# Patient Record
Sex: FEMALE | Employment: UNEMPLOYED | URBAN - METROPOLITAN AREA
[De-identification: names, ages, dates, MRNs, and addresses within clinical notes are randomized per-mention and may not be internally consistent; named-entity substitution may affect disease eponyms.]

---

## 2017-03-01 ENCOUNTER — TRANSFERRED RECORDS (OUTPATIENT)
Dept: HEALTH INFORMATION MANAGEMENT | Facility: CLINIC | Age: 52
End: 2017-03-01

## 2017-09-21 ENCOUNTER — OFFICE VISIT (OUTPATIENT)
Dept: NEUROLOGY | Facility: CLINIC | Age: 52
End: 2017-09-21

## 2017-09-21 VITALS
DIASTOLIC BLOOD PRESSURE: 58 MMHG | HEIGHT: 62 IN | BODY MASS INDEX: 21.64 KG/M2 | WEIGHT: 117.6 LBS | SYSTOLIC BLOOD PRESSURE: 118 MMHG

## 2017-09-21 DIAGNOSIS — G40.209 PARTIAL EPILEPSY WITH IMPAIRMENT OF CONSCIOUSNESS (H): ICD-10-CM

## 2017-09-21 DIAGNOSIS — G40.209 LOCALIZATION-RELATED SYMPTOMATIC EPILEPSY AND EPILEPTIC SYNDROMES WITH COMPLEX PARTIAL SEIZURES, NOT INTRACTABLE, WITHOUT STATUS EPILEPTICUS (H): ICD-10-CM

## 2017-09-21 RX ORDER — KETOCONAZOLE 20 MG/G
CREAM TOPICAL
Refills: 3 | COMMUNITY
Start: 2017-09-01

## 2017-09-21 RX ORDER — PHENYTOIN SODIUM 100 MG/1
CAPSULE, EXTENDED RELEASE ORAL
Qty: 60 CAPSULE | Refills: 11 | Status: SHIPPED | OUTPATIENT
Start: 2017-09-21 | End: 2020-12-10

## 2017-09-21 NOTE — MR AVS SNAPSHOT
After Visit Summary   9/21/2017    Kimberly Noe    MRN: [id number]           Patient Information     Date Of Birth          1965        Visit Information        Provider Department      9/21/2017 2:00 PM Hieu Yoo MD [facility]        Today's Diagnoses     Partial epilepsy with impairment of consciousness (H)        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, not intractable, without status epilepticus (H)           Follow-ups after your visit        Follow-up notes from your care team     Return in about 1 year (around 9/21/2018).      Your next 10 appointments already scheduled     Nov 21, 2017 11:30 AM CST   [facility])    [address], [address]  [facility] [address] [phone]           Note: This is not an onsite visit; there is no need to come to the facility.              Who to contact     Please call your clinic at [phone] to:    Ask questions about your health    Make or cancel appointments    Discuss your medicines    Learn about your test results    Speak to your doctor   If you have compliments or concerns about an experience at your clinic, or if you wish to file a complaint, please contact [facility] Patient Relations at [phone] or email us at [email or web address]         Additional Information About Your Visit        MyChart Information     Tytot gives you secure access to your electronic health record. If you see a primary care provider, you can also send messages to your care team and make appointments. If you have questions, please call your primary care clinic.  If you do not have a primary care provider, please call [phone] and they will assist you.      InGaugeIt is an electronic gateway that provides easy, online access to your  "medical records. With jslyhlt, you can request a clinic appointment, read your test results, renew a prescription or communicate with your care team.     To access your existing account, please contact your [facility] or call [phone] for assistance.        Care EveryWhere ID     This is your Care EveryWhere ID. This could be used by other organizations to access your [facility] medical records  [id number]        Your Vitals Were     Height BMI (Body Mass Index)                5' 2\" (157.5 cm) 21.51 kg/m2           Blood Pressure from Last 3 Encounters:   09/21/17 118/58   10/18/16 104/64   07/06/16 (!) 86/56    Weight from Last 3 Encounters:   09/21/17 117 lb 9.6 oz (53.3 kg)   10/18/16 119 lb 3.2 oz (54.1 kg)   07/06/16 121 lb (54.9 kg)              Today, you had the following     No orders found for display         Where to get your medicines      These medications were sent to [facility] [address] [place], [place] [address] [address] AT [facility] [employer]  [facility] [address] [place], [facility] [address]     Phone:  [phone]     DILANTIN 100 MG CR capsule    phenytoin 30 MG CR capsule          Primary Care Provider Office Phone # Fax #    Ketty Lazardevontenbjony, APRN -[phone] [address] [place] [address]        Equal Access to Services     JAX MENSAH AH: Hadii aad ku hadasho Soomaali, waaxda luqadaha, qaybta kaalmada adeegyada, pacheco garcia haykarol cantu . So [facility] [phone].    ATENCIÓN: Si habla español, tiene a santana disposición servicios gratuitos de asistencia lingüística. Llame al [phone].    We comply with applicable federal civil rights laws and [place] laws. We do not discriminate on the basis of race, color, national origin, age, disability sex, sexual orientation or gender identity.            Thank you!     Thank you for choosing [facility]  for your care. Our goal is always to provide you " with excellent care. Hearing back from our patients is one way we can continue to improve our services. Please take a few minutes to complete the written survey that you may receive in the mail after your visit with us. Thank you!             Your Updated Medication List - Protect others around you: Learn how to safely use, store and throw away your medicines at www.disposemymeds.org.          This list is accurate as of: 9/21/17  2:53 PM.  Always use your most recent med list.                   Brand Name Dispense Instructions for use Diagnosis    BOOSTRIX 5-2.5-18.5 LF-MCG/0.5 injection   Generic drug:  Tdap (tetanus-diptheria-acell pertussis)      ADM 0.5ML IM UTD        calcium carbonate 1250 MG tablet    OS-MARK 500 mg [place]. Ca     1000 mg daily        clotrimazole 1 % cream    LOTRIMIN          CRANBERRY      daily.        * DILANTIN 100 MG CR capsule   Generic drug:  phenytoin     60 capsule    TAKE 2 CAPSULES BY MOUTH DAILY EMELYN Brand only, do not substitute    Partial epilepsy with impairment of consciousness (H)       * phenytoin 30 MG CR capsule    DILANTIN    30 capsule    Take one po QHS Brand name necessary, no not substitute.    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, not intractable, without status epilepticus (H)       folic acid 1 MG tablet    FOLVITE    180 tablet    TAKE TWO TABLETS BY MOUTH EVERY MORNING    Localization-related (focal) (partial) epilepsy and epileptic syndromes with complex partial seizures, with intractable epilepsy       ketoconazole 2 % cream    NIZORAL     JAH EXT AA QD        Multi-vitamin Tabs tablet   Generic drug:  multivitamin, therapeutic with minerals      1 TABLET DAILY        OMEGA 3 PO      1 daily.        order for DME     1 each    Equipment being ordered: velcro wrist splint    Left wrist pain       vitamin D 400 UNITS tablet     3mo    2000mg po per day    Routine general medical examination at a [facility]  care facility       * Notice:  This list has 2 medication(s) that are the same as other medications prescribed for you. Read the directions carefully, and ask your doctor or other care provider to review them with you.

## 2017-09-21 NOTE — LETTER
2017       RE: Kimberly Noe  : 1965   MRN: [id number]      Dear Colleague,    Thank you for referring your patient, Kimberly Noe, to the [facility] at [facility]. Please see a copy of my visit note below.    Entered in error    BACKGROUND HISTORY:  This is a 52-year-old with 7 complex partial seizures during her life, typically in the setting of significant sleep deprivation, low phenytoin level or significant stress.  Two attempts at phenytoin discontinuation, 1 after 5 years of seizure freedom, resulted in seizure breakthrough.  Other seizure breakthroughs have occurred through low phenytoin levels, so we have concluded that she needs anticonvulsant medications indefinitely.  Previous low vitamin D was treated.  A DEXA scan in 2012 with LS spine BMD 1.210 (Z score = +0.7); left hip BMD 0.883 (Z-scores -0.3).        HISTORY OF PRESENT ILLNESS:  She returns for a followup 10 months since last visit.  She has had no seizures since last visit.  Her last seizure occurred 19 years ago.      CURRENT ANTICONVULSANT MEDICATIONS:  Phenytoin (brand name Dilantin) (100 mg, 30 mg) 230 mg each day at bedtime.      She brings anticonvulsant levels with her today.  Phenytoin level done on 2017 was 12.6.  Her vitamin D level was 51.6.      She reports compliance with anticonvulsant medications and uses a pillbox.      REVIEW OF SYSTEMS:  Continues unremarkable.  Specifically, she denies nausea, vomiting, double vision, depression, slowed cognitive function, ataxia or any falls.      OTHER ISSUES:  There have been no fractures.  She had a second DEXA scan done and results have been scanned into EPIC.  The left femur BMD was 0.788 g/cm2 with a Z-score of -0.7.  The lumbar spine BMD was 1.132, g/cm2 with a Z-score of -0.01.  This represents a decrease in both the LS spine and left hip BMD Z-score.  She has met the threshold for osteopenia,  but has not formally met the threshold for osteoporosis.  It is not clear whether the decline is more than would be expected over 5 years.      There have been no fractures.  She does report some low back pain, but this does not radiate.      There have been no other medical issues.  She is safe to drive.  She is postmenopausal now for approximately a year.      PHYSICAL EXAMINATION:     Weight is 117 pounds and not significantly changed.  Blood pressure 118/58.  BMI is 21.5.  On examination, she is her usual pleasant and cooperative self.  Gait is normal including tandem and Romberg.  One-foot stand is done well.  Visual fields are full.  Extraocular movements are intact without nystagmus.  Smile is symmetrical.  Tongue is midline and strong.  There is no drift, pronation or tremor.  Reflexes are symmetrical.  Knee jerks and ankle jerks are easily obtained.  Vibratory sensation is 10 seconds at the large toe bilaterally.  Finger-finger-nose is done well.      IMPRESSION:   1.  Partial seizures, not intractable.  Simple to complex partial to secondarily generalized tonic-clonic seizures.  She continues with complete control of seizures.  She has now been seizure free for more than 19 years.  However, her seizures consistently broken through when anticonvulsant levels are low or medications were discontinued in the past.  History does suggest that she needs anticonvulsants indefinitely.  She has gotten by with lower doses and low levels of anticonvulsant levels since last visit.   2.  There has been some decline in bone mineral density in spite of a normal vitamin D level.  It is not clear whether this is an acceptable decline given her postmenopausal state or whether this is an indication that phenytoin should be replaced with another anticonvulsant medication.  She meets criteria for osteopenia at least as far as hip is concerned, but does not formally meet criteria for osteoporosis.  Additionally, alkaline  phosphatase level is somewhat increased.  Under the circumstances, it may be appropriate to transition to levetiracetam.   3.  She now clearly postmenopausal.  There has been no worsening of seizures.      PLAN:   1.  Continue current phenytoin dose.     2.  We will try to get the full DEXA reports from the paper chart for more complete comparison.   3.  Phone visit in several months.  At that point, we would consider changing to levetiracetam.  We discussed the pros and cons of changing from phenytoin to levetiracetam in a preliminary fashion today.  These include but are not limited to irritability, depression and other indications of psychiatric decompensation.  There is also the possibility that levetiracetam will not be as effective as phenytoin.  However, given the fact that her seizures have been controlled for such a long time, it appears that her seizure tendency is low and so one would expect that levetiracetam would be as successful at seizure control as phenytoin.   4.  Return to clinic in 1 year.  Call if any seizures.         JUANCHO YOO MD             D: 2017 17:52   T: 2017 21:45   MT: tobi      Name:     MARYJO RUIZ   MRN:      [id number]        Account:      [id number]   :      1965           Service Date: 2017      Document: [id number]        Again, thank you for allowing me to participate in the care of your patient.      Sincerely,    Juancho Yoo MD

## 2017-09-22 NOTE — PROGRESS NOTES
BACKGROUND HISTORY:  This is a 52-year-old with 7 complex partial seizures during her life, typically in the setting of significant sleep deprivation, low phenytoin level or significant stress.  Two attempts at phenytoin discontinuation, 1 after 5 years of seizure freedom, resulted in seizure breakthrough.  Other seizure breakthroughs have occurred through low phenytoin levels, so we have concluded that she needs anticonvulsant medications indefinitely.  Previous low vitamin D was treated.  A DEXA scan in 06/2012 with LS spine BMD 1.210 (Z score = +0.7); left hip BMD 0.883 (Z-scores -0.3).        HISTORY OF PRESENT ILLNESS:  She returns for a followup 10 months since last visit.  She has had no seizures since last visit.  Her last seizure occurred 19 years ago.      CURRENT ANTICONVULSANT MEDICATIONS:  Phenytoin (brand name Dilantin) (100 mg, 30 mg) 230 mg each day at bedtime.      She brings anticonvulsant levels with her today.  Phenytoin level done on 09/01/2017 was 12.6.  Her vitamin D level was 51.6.      She reports compliance with anticonvulsant medications and uses a pillbox.      REVIEW OF SYSTEMS:  Continues unremarkable.  Specifically, she denies nausea, vomiting, double vision, depression, slowed cognitive function, ataxia or any falls.      OTHER ISSUES:  There have been no fractures.  She had a second DEXA scan done and results have been scanned into EPIC.  The left femur BMD was 0.788 g/cm2 with a Z-score of -0.7.  The lumbar spine BMD was 1.132, g/cm2 with a Z-score of -0.01.  This represents a decrease in both the LS spine and left hip BMD Z-score.  She has met the threshold for osteopenia, but has not formally met the threshold for osteoporosis.  It is not clear whether the decline is more than would be expected over 5 years.      There have been no fractures.  She does report some low back pain, but this does not radiate.      There have been no other medical issues.  She is safe to drive.  She is  postmenopausal now for approximately a year.      PHYSICAL EXAMINATION:     Weight is 117 pounds and not significantly changed.  Blood pressure 118/58.  BMI is 21.5.  On examination, she is her usual pleasant and cooperative self.  Gait is normal including tandem and Romberg.  One-foot stand is done well.  Visual fields are full.  Extraocular movements are intact without nystagmus.  Smile is symmetrical.  Tongue is midline and strong.  There is no drift, pronation or tremor.  Reflexes are symmetrical.  Knee jerks and ankle jerks are easily obtained.  Vibratory sensation is 10 seconds at the large toe bilaterally.  Finger-finger-nose is done well.      IMPRESSION:   1.  Partial seizures, not intractable.  Simple to complex partial to secondarily generalized tonic-clonic seizures.  She continues with complete control of seizures.  She has now been seizure free for more than 19 years.  However, her seizures consistently broken through when anticonvulsant levels are low or medications were discontinued in the past.  History does suggest that she needs anticonvulsants indefinitely.  She has gotten by with lower doses and low levels of anticonvulsant levels since last visit.   2.  There has been some decline in bone mineral density in spite of a normal vitamin D level.  It is not clear whether this is an acceptable decline given her postmenopausal state or whether this is an indication that phenytoin should be replaced with another anticonvulsant medication.  She meets criteria for osteopenia at least as far as hip is concerned, but does not formally meet criteria for osteoporosis.  Additionally, alkaline phosphatase level is somewhat increased.  Under the circumstances, it may be appropriate to transition to levetiracetam.   3.  She now clearly postmenopausal.  There has been no worsening of seizures.      PLAN:   1.  Continue current phenytoin dose.     2.  We will try to get the full DEXA reports from the paper chart  for more complete comparison.   3.  Phone visit in several months.  At that point, we would consider changing to levetiracetam.  We discussed the pros and cons of changing from phenytoin to levetiracetam in a preliminary fashion today.  These include but are not limited to irritability, depression and other indications of psychiatric decompensation.  There is also the possibility that levetiracetam will not be as effective as phenytoin.  However, given the fact that her seizures have been controlled for such a long time, it appears that her seizure tendency is low and so one would expect that levetiracetam would be as successful at seizure control as phenytoin.   4.  Return to clinic in 1 year.  Call if any seizures.         JUANCHO GOMEZ MD             D: 2017 17:52   T: 2017 21:45   MT: tobi      Name:     MARYJO RUIZ   MRN:      [id number]        Account:      [id number]   :      1965           Service Date: 2017      Document: [id number]

## 2017-10-07 ENCOUNTER — HEALTH MAINTENANCE LETTER (OUTPATIENT)
Age: 52
End: 2017-10-07

## 2017-11-21 ENCOUNTER — VIRTUAL VISIT (OUTPATIENT)
Dept: NEUROLOGY | Facility: CLINIC | Age: 52
End: 2017-11-21

## 2017-11-21 DIAGNOSIS — G40.209 PARTIAL EPILEPSY WITH IMPAIRMENT OF CONSCIOUSNESS (H): ICD-10-CM

## 2017-11-21 DIAGNOSIS — G40.209 PARTIAL EPILEPSY WITH IMPAIRMENT OF CONSCIOUSNESS (H): Primary | ICD-10-CM

## 2017-11-21 RX ORDER — LEVETIRACETAM 500 MG/1
TABLET ORAL
Qty: 120 TABLET | Refills: 11 | Status: SHIPPED | OUTPATIENT
Start: 2017-11-21 | End: 2017-11-21

## 2017-11-21 NOTE — PROGRESS NOTES
Patient not in. Left message.  Review of serial DEXAs shows clear decline but not clear that much more than would be expected.  Not standard candidate for bisphosphonates. On the other hand given long period of seizure control, small bones, etc probably best to move to levetiracetam if she can tolerate.    Will discuss when she calls.    She called back around 1215. Overall I think transitioning to levetiracetam would be best for her long term health in that it would reduce risk of osteoporosis, neuropathy, cholesterol dysmetabolism and cerebellopathy all of which may be more of an issue as she grows older. Risks are that levetiracetam will not be as effective as phenytoin and that she will get behavioral or other side effects.  Discussed above in detail. She report she has given the matter some thought and would like to transition to the levetiracetam.    Wt Readings from Last 5 Encounters:   09/21/17 117 lb 9.6 oz (53.3 kg)   10/18/16 119 lb 3.2 oz (54.1 kg)   07/06/16 121 lb (54.9 kg)   03/16/16 123 lb (55.8 kg)   10/23/15 119 lb 9.6 oz (54.3 kg)     Will write Rx for levetiracetam and write letter with schedule gradually increasing to 2000 mg per day over two months. Will ask to return to clinic in 3 months. If doing well would initiate slow taper of phenytoin at that time.

## 2017-11-21 NOTE — MR AVS SNAPSHOT
After Visit Summary   11/21/2017    Kimberly Noe    MRN: [id number]           Patient Information     Date Of Birth          1965        Visit Information        Provider Department      11/21/2017 11:30 AM Hieu Yoo MD [facility]        Today's Diagnoses     Partial epilepsy with impairment of consciousness (H)    -  1       Follow-ups after your visit        Follow-up notes from your care team     Return in about 3 months (around 2/21/2018).      Who to contact     Please call your clinic at [phone] to:    Ask questions about your health    Make or cancel appointments    Discuss your medicines    Learn about your test results    Speak to your doctor   If you have compliments or concerns about an experience at your clinic, or if you wish to file a complaint, please contact [facility] Patient Relations at [phone] or email us at [email or web address]         Additional Information About Your Visit        MyChart Information     OZZ [employer] gives you secure access to your electronic health record. If you see a primary care provider, you can also send messages to your care team and make appointments. If you have questions, please call your primary care clinic.  If you do not have a primary care provider, please call [phone] and they will assist you.      Signicast is an electronic gateway that provides easy, online access to your medical records. With Signicast, you can request a clinic appointment, read your test results, renew a prescription or communicate with your care team.     To access your existing account, please contact your [facility] Clinic or call [phone] for assistance.        Care EveryWhere ID     This is your Care EveryWhere ID. This could be used by other organizations to access your [place] medical records  [id number]         Blood Pressure from Last 3 Encounters:    09/21/17 118/58   10/18/16 104/64   07/06/16 (!) 86/56    Weight from Last 3 Encounters:   09/21/17 117 lb 9.6 oz (53.3 kg)   10/18/16 119 lb 3.2 oz (54.1 kg)   07/06/16 121 lb (54.9 kg)              Today, you had the following     No orders found for display         Today's Medication Changes          These changes are accurate as of: 11/21/17  4:05 PM.  If you have any questions, ask your nurse or doctor.               Start taking these medicines.        Dose/Directions    levETIRAcetam 500 MG tablet   Commonly known as:  KEPPRA   Used for:  Partial epilepsy with impairment of consciousness (H)   Started by:  Hieu Yoo MD        Increase as instructed until taking two tablets twice daily (total 2000 mg per day).   Quantity:  120 tablet   Refills:  11            Where to get your medicines      These medications were sent to [facility] [address] [place] [id number]     Phone:  [phone]     levETIRAcetam 500 MG tablet                Primary Care Provider Office Phone # Fax #    Camron Mcfarland [phone]       [place] [address] [facility] [address]        Equal Access to Services     JAX MENSAH AH: Lanie mendiola Sodevi, waaxda luqadaha, qaybta kaalmapacheco sullivan . So [facility] [phone].    ATENCIÓN: Si habla español, tiene a santana disposición servicios gratuitos de asistencia lingüística. Ashley al [phone].    We comply with applicable federal civil rights laws and [place] laws. We do not discriminate on the basis of race, color, national origin, age, disability, sex, sexual orientation, or gender identity.            Thank you!     Thank you for choosing [facility]  for your care. Our goal is always to provide you with excellent care. Hearing back from our patients is one way we can continue to improve our  services. Please take a few minutes to complete the written survey that you may receive in the mail after your visit with us. Thank you!             Your Updated Medication List - Protect others around you: Learn how to safely use, store and throw away your medicines at www.disposemymeds.org.          This list is accurate as of: 11/21/17  4:05 PM.  Always use your most recent med list.                   Brand Name Dispense Instructions for use Diagnosis    BOOSTRIX 5-2.5-18.5 LF-MCG/0.5 injection   Generic drug:  Tdap (tetanus-diptheria-acell pertussis)      ADM 0.5ML IM UTD        calcium carbonate 1250 MG tablet    OS-MARK 500 mg [place]. [place]     1000 mg daily        clotrimazole 1 % cream    LOTRIMIN          CRANBERRY      daily.        * DILANTIN 100 MG CR capsule   Generic drug:  phenytoin     60 capsule    TAKE 2 CAPSULES BY MOUTH DAILY EMELYN Brand only, do not substitute    Partial epilepsy with impairment of consciousness (H)       * phenytoin 30 MG CR capsule    DILANTIN    30 capsule    Take one po QHS Brand name necessary, no not substitute.    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, not intractable, without status epilepticus (H)       folic acid 1 MG tablet    FOLVITE    180 tablet    TAKE TWO TABLETS BY MOUTH EVERY MORNING    Localization-related (focal) (partial) epilepsy and epileptic syndromes with complex partial seizures, with intractable epilepsy       ketoconazole 2 % cream    NIZORAL     JAH EXT AA QD        levETIRAcetam 500 MG tablet    KEPPRA    120 tablet    Increase as instructed until taking two tablets twice daily (total 2000 mg per day).    Partial epilepsy with impairment of consciousness (H)       Multi-vitamin Tabs tablet   Generic drug:  multivitamin, therapeutic with minerals      1 TABLET DAILY        OMEGA 3 PO      1 daily.        order for DME     1 each    Equipment being ordered: velcro wrist splint    Left wrist pain        vitamin D 400 UNITS tablet     3mo    2000mg po per day    Routine general medical examination at a health care facility       * Notice:  This list has 2 medication(s) that are the same as other medications prescribed for you. Read the directions carefully, and ask your doctor or other care provider to review them with you.

## 2017-11-21 NOTE — LETTER
11/21/2017       RE: Kimberly Noe  [address] [facility] [address]      Dear Ms Roberto Noe,    As we discussed, we have decided to start adding levetiracetam (keppra) to your dilantin. Our goal will be to get you to the levetiracetam by itself. However you will need to be on both medications for a period of time. I have sent a prescription for the levetiracetam to your pharmacy. We have written the perscription for 500 mg tablets. You should start the levetiracetam as follows:    Week 1: take half a pill (250 mg) in the evening. Total of 250 mg per day.    Week 2: take half a pill (250 mg) twice daily. Total of 500 mg per day.    Week 3: take half a pill (250 mg) in the morning, one pill (500 mg) in the evening. Total 750 mg per day.    Week 4: take one pill (500 mg) twice daily. Total 1000 mg per day.    Week 5: take one pill (500 mg) in the morning, one and one half pills (750 mg) in evening. Total 1250 mg per day.    Week 6: take one and one half pills (750 mg) twice daily. Total 1500 mg per day.    Week 7: take one and one half pills (750 mg) in the morning, and two pills (1000 mg) in the evening. Total 1750 mg per day.    Week 8 and onwards until we see you in clinic: take two pills (1000 mg) twice daily. Total 2000 mg per day.    We will ask our  to have you see us back in 3 months. If you are doing well we can start reducing and discontinuing the dilantin at that time. Please call if you experience side effects with the new medication or if the above instructions are at all confusing.    Sincerely,    Hieu Yoo MD

## 2017-11-24 RX ORDER — LEVETIRACETAM 500 MG/1
TABLET ORAL
Qty: 360 TABLET | Refills: 3 | Status: SHIPPED | OUTPATIENT
Start: 2017-11-24 | End: 2018-09-05

## 2017-11-24 NOTE — TELEPHONE ENCOUNTER
Pt requesting medication dispense be changed from 30 day supply to 90 day supply. Refill approved.

## 2018-01-13 DIAGNOSIS — G40.209 PARTIAL EPILEPSY WITH IMPAIRMENT OF CONSCIOUSNESS (H): ICD-10-CM

## 2018-01-15 RX ORDER — LEVETIRACETAM 500 MG/1
TABLET ORAL
Qty: 360 TABLET | Refills: 11 | OUTPATIENT
Start: 2018-01-15

## 2018-02-09 DIAGNOSIS — G40.209 PARTIAL EPILEPSY WITH IMPAIRMENT OF CONSCIOUSNESS (H): ICD-10-CM

## 2018-02-12 RX ORDER — LEVETIRACETAM 500 MG/1
TABLET ORAL
Qty: 360 TABLET | Refills: 11 | OUTPATIENT
Start: 2018-02-12

## 2018-03-18 ENCOUNTER — OFFICE VISIT (OUTPATIENT)
Dept: URGENT CARE | Facility: URGENT CARE | Age: 53
End: 2018-03-18
Payer: COMMERCIAL

## 2018-03-18 VITALS
HEIGHT: 61 IN | RESPIRATION RATE: 14 BRPM | BODY MASS INDEX: 22.66 KG/M2 | SYSTOLIC BLOOD PRESSURE: 98 MMHG | OXYGEN SATURATION: 98 % | TEMPERATURE: 97.6 F | WEIGHT: 120 LBS | HEART RATE: 86 BPM | DIASTOLIC BLOOD PRESSURE: 64 MMHG

## 2018-03-18 DIAGNOSIS — R30.0 DYSURIA: Primary | ICD-10-CM

## 2018-03-18 LAB
ALBUMIN UR-MCNC: NEGATIVE MG/DL
APPEARANCE UR: CLEAR
BILIRUB UR QL STRIP: NEGATIVE
COLOR UR AUTO: YELLOW
GLUCOSE UR STRIP-MCNC: NEGATIVE MG/DL
HGB UR QL STRIP: NEGATIVE
KETONES UR STRIP-MCNC: NEGATIVE MG/DL
LEUKOCYTE ESTERASE UR QL STRIP: ABNORMAL
NITRATE UR QL: NEGATIVE
PH UR STRIP: 7 PH (ref 5–7)
RBC #/AREA URNS AUTO: NORMAL /HPF
SOURCE: ABNORMAL
SP GR UR STRIP: 1.01 (ref 1–1.03)
SPECIMEN SOURCE: NORMAL
UROBILINOGEN UR STRIP-ACNC: 0.2 EU/DL (ref 0.2–1)
WBC #/AREA URNS AUTO: NORMAL /HPF
WET PREP SPEC: NORMAL

## 2018-03-18 PROCEDURE — 99213 OFFICE O/P EST LOW 20 MIN: CPT | Performed by: HOSPITALIST

## 2018-03-18 PROCEDURE — 87210 SMEAR WET MOUNT SALINE/INK: CPT | Performed by: HOSPITALIST

## 2018-03-18 PROCEDURE — 81001 URINALYSIS AUTO W/SCOPE: CPT | Performed by: HOSPITALIST

## 2018-03-18 PROCEDURE — 87086 URINE CULTURE/COLONY COUNT: CPT | Performed by: HOSPITALIST

## 2018-03-18 NOTE — NURSING NOTE
"Chief Complaint   Patient presents with     Urgent Care     UTI     finished 10 day course of antibiiotic for UTI, still having abdominal pain, slight urgency.        Initial BP 98/64  Pulse 86  Temp 97.6  F (36.4  C) (Oral)  Resp 14  Ht 5' 1\" (1.549 m)  Wt 120 lb (54.4 kg)  SpO2 98%  Breastfeeding? No  BMI 22.67 kg/m2 Estimated body mass index is 22.67 kg/(m^2) as calculated from the following:    Height as of this encounter: 5' 1\" (1.549 m).    Weight as of this encounter: 120 lb (54.4 kg).  Medication Reconciliation: complete  "

## 2018-03-18 NOTE — PROGRESS NOTES
"  Pt came here for bladder pressure. Was seen about 2 weeks ago and was treated with antibiotic, she is not sure what antibiotic, it was 2 times daily for 10 days. She does not feel better due to that she came here today. She denies any fever    Allergies   Allergen Reactions     Codeine      Vomiting     Penicillins      Rash and swelling         Past Medical History:   Diagnosis Date     Benign positional vertigo      Endometriosis, site unspecified      Other convulsions      Vitamin D deficiency          Current Outpatient Prescriptions on File Prior to Visit:  levETIRAcetam (KEPPRA) 500 MG tablet INCREASE AS INSTRUCTED UNTIL TAKING 2 TABLETS TWICE DAILY   DILANTIN 100 MG CR capsule TAKE 2 CAPSULES BY MOUTH DAILY EMELYN Brand only, do not substitute   CRANBERRY daily.    CALCIUM 500 MG OR TABS 1000 mg daily   ketoconazole (NIZORAL) 2 % cream JAH EXT AA QD   BOOSTRIX 5-2.5-18.5 injection ADM 0.5ML IM UTD   phenytoin (DILANTIN) 30 MG CR capsule Take one po QHS Brand name necessary, no not substitute.   clotrimazole (LOTRIMIN) 1 % cream    folic acid (FOLVITE) 1 MG tablet TAKE TWO TABLETS BY MOUTH EVERY MORNING (Patient not taking: Reported on 9/21/2017)   ORDER FOR DME Equipment being ordered: velcro wrist splint (Patient not taking: Reported on 9/21/2017)   Omega-3 Fatty Acids (OMEGA 3 PO) 1 daily.    VITAMIN D 400 UNIT OR TABS 2000mg po per day   MULTI-VITAMIN OR TABS 1 TABLET DAILY     No current facility-administered medications on file prior to visit.     Social History   Substance Use Topics     Smoking status: Never Smoker     Smokeless tobacco: Never Used     Alcohol use No       ROS:  Consitutional: As above  ENT: As above  Respiratory: As above    OBJECTIVE:  BP 98/64  Pulse 86  Temp 97.6  F (36.4  C) (Oral)  Resp 14  Ht 5' 1\" (1.549 m)  Wt 120 lb (54.4 kg)  SpO2 98%  Breastfeeding? No  BMI 22.67 kg/m2  GENERAL APPEARANCE: healthy, alert and mild distress  EYES: conjunctiva clear  EARS:no cerumen.   " Ear canals no erythema, TM's intact no erythema .    NOSE/MOUTH: Nose and mouth is normal, no erythema or lesions  THROAT: no erythema w/ no tonsillar enlargement . no exudates  NECK: supple, nontender, no lymphadenopathy  RESP: lungs clear to auscultation - no rales, rhonchi or wheezes  CV: regular rates and rhythm, normal S1 S2, no murmur noted  NEURO: awake, alert        Recent Results (from the past 168 hour(s))   *UA reflex to Microscopic and Culture ([facility] (except [facility])    Collection Time: 03/18/18  3:13 PM   Result Value Ref Range    Color Urine Yellow     Appearance Urine Clear     Glucose Urine Negative NEG^Negative mg/dL    Bilirubin Urine Negative NEG^Negative    Ketones Urine Negative NEG^Negative mg/dL    Specific Gravity Urine 1.015 1.003 - 1.035    Blood Urine Negative NEG^Negative    pH Urine 7.0 5.0 - 7.0 pH    Protein Albumin Urine Negative NEG^Negative mg/dL    Urobilinogen Urine 0.2 0.2 - 1.0 EU/dL    Nitrite Urine Negative NEG^Negative    Leukocyte Esterase Urine Moderate (A) NEG^Negative    Source Midstream Urine    Wet prep    Collection Time: 03/18/18  3:13 PM   Result Value Ref Range    Specimen Description Vagina     Wet Prep No clue cells seen     Wet Prep No yeast seen     Wet Prep No Trichomonas seen    Urine Microscopic    Collection Time: 03/18/18  3:13 PM   Result Value Ref Range    WBC Urine 0 - 5 OTO5^0 - 5 /HPF    RBC Urine O - 2 OTO2^O - 2 /HPF        ASSESSMENT:     ICD-10-CM    1. Dysuria R30.0 *UA reflex to Microscopic and Culture ([facility] (except [place] and [place])     Wet prep     Urine Microscopic         PLAN:    Urine is clear but positive leucocyte esterase. Wbc urine negative. Will do urine culture. Advice to take ibuprofen. Might be ureteritis. May also take tylenol. If culture positive will treat as per culture reasult  Lots of rest and fluids.  Follow up in 4-7 days if not better or sooner if getting  worse .    Porsha Cobb MD

## 2018-03-18 NOTE — MR AVS SNAPSHOT
After Visit Summary   3/18/2018    Kimberly Noe    MRN: [id number]           Patient Information     Date Of Birth          1965        Visit Information        Provider Department      3/18/2018 2:25 PM Porsha Cobb MD [facility]        Today's Diagnoses     Dysuria    -  1       Follow-ups after your visit        Your next 10 appointments already scheduled     Aug 24, 2018  1:45 PM CDT   (Arrive by 1:30 PM)   Return Seizure with Hieu Yoo MD   [facility] ([facility] [place])    [address] [place] [id number] [phone]              Who to contact     If you have questions or need follow up information about today's clinic visit or your schedule please contact [facility] directly at [phone].  Normal or non-critical lab and imaging results will be communicated to you by MyChart, letter or phone within 4 business days after the clinic has received the results. If you do not hear from us within 7 days, please contact the clinic through MyChart or phone. If you have a critical or abnormal lab result, we will notify you by phone as soon as possible.  Submit refill requests through kinkon or call your pharmacy and they will forward the refill request to us. Please allow 3 business days for your refill to be completed.          Additional Information About Your Visit        MyChart Information     kinkon gives you secure access to your electronic health record. If you see a primary care provider, you can also send messages to your care team and make appointments. If you have questions, please call your primary care clinic.  If you do not have a primary care provider, please call [phone] and they will assist you.        Care EveryWhere ID     This is your Care EveryWhere ID. This could be used by other organizations to access your [facility]  "records  [id number]        Your Vitals Were     Pulse Temperature Respirations Height Pulse Oximetry Breastfeeding?    86 97.6  F (36.4  C) (Oral) 14 5' 1\" (1.549 m) 98% No    BMI (Body Mass Index)                   22.67 kg/m2            Blood Pressure from Last 3 Encounters:   03/18/18 98/64   09/21/17 118/58   10/18/16 104/64    Weight from Last 3 Encounters:   03/18/18 120 lb (54.4 kg)   09/21/17 117 lb 9.6 oz (53.3 kg)   10/18/16 119 lb 3.2 oz (54.1 kg)              We Performed the Following     *UA reflex to Microscopic and Culture ([place] and [facility] (except [place] and [place])     Urine Culture Aerobic Bacterial     Urine Microscopic     Wet prep        Primary Care Provider Office Phone # Fax #    Camron MD Bartolo [phone]       [place] [address]        Equal Access to Services     JAX MENSAH : Hadii aad ku hadasho Soomaali, waaxda luqadaha, qaybta kaalmada adeegyada, waxay idiin hayaan adeeg kharami la'raymondn . So [facility] [phone].    ATENCIÓN: Si habla español, tiene a santana disposición servicios gratuitos de asistencia lingüística. Llame al [phone].    We comply with applicable federal civil rights laws and [place] laws. We do not discriminate on the basis of race, color, national origin, age, disability, sex, sexual orientation, or gender identity.            Thank you!     Thank you for choosing [facility]  for your care. Our goal is always to provide you with excellent care. Hearing back from our patients is one way we can continue to improve our services. Please take a few minutes to complete the written survey that you may receive in the mail after your visit with us. Thank you!             Your Updated Medication List - Protect others around you: Learn how to safely use, store and throw away your medicines at www.disposemymeds.org.          This list is accurate as of 3/18/18  3:51 PM.  Always " use your most recent med list.                   Brand Name Dispense Instructions for use Diagnosis    BOOSTRIX 5-2.5-18.5 LF-MCG/0.5 injection   Generic drug:  Tdap (tetanus-diptheria-acell pertussis)      ADM 0.5ML IM UTD        calcium carbonate 1250 MG tablet    OS-MARK 500 mg [place]. [place]     1000 mg daily        clotrimazole 1 % cream    LOTRIMIN          CRANBERRY      daily.        * DILANTIN 100 MG CR capsule   Generic drug:  phenytoin     60 capsule    TAKE 2 CAPSULES BY MOUTH DAILY EMELYN Brand only, do not substitute    Partial epilepsy with impairment of consciousness (H)       * phenytoin 30 MG CR capsule    DILANTIN    30 capsule    Take one po QHS Brand name necessary, no not substitute.    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        order for DME     1 each    Equipment being ordered: velcro wrist splint    Left wrist pain       vitamin D 400 UNITS tablet     3mo    2000mg po per day    Routine general medical examination at a health care facility       * Notice:  This list has 2 medication(s) that are the same as other medications prescribed for you. Read the directions carefully, and ask your doctor or other care provider to review them with you.

## 2018-03-19 LAB
BACTERIA SPEC CULT: NO GROWTH
SPECIMEN SOURCE: NORMAL

## 2018-06-12 ENCOUNTER — TELEPHONE (OUTPATIENT)
Dept: NEUROLOGY | Facility: CLINIC | Age: 53
End: 2018-06-12

## 2018-06-12 NOTE — TELEPHONE ENCOUNTER
Caller: Kimberly   Relationship to Patient: self   Call Back Number: [phone]   Reason for Call: patient stated that she was supposed to decrease the dilantin. Would like to get instructions on this    Kimberly is currently taking Phenytoin 230 mg HS.  Kimberly's last seizure occurred 20 years ago. She is well controlled.  Tolerating Keprra without any difficulties. She confirms she is taking LEV 1000 BID.  Would like directions to further taper off Phenytoin.    PLAN: Discussed with Dr. Naila Gutierrez to drive.    Advised to avoid baths, swimming, or other activities that could cause harm/injury in the event of a seizure.    Currenty prescribed Phenytoin 230 mg HS    Week 1 - 6  reduce PHT to 200 mg HS    Week 7 - 12: reduce PHT to 100 mg HS    Week 13 - STOP Phenytoin    Kimberly accurately recited back the directions listed above.     Encouraged Kimberly to call if she has questions or concerns.

## 2018-09-05 ENCOUNTER — OFFICE VISIT (OUTPATIENT)
Dept: NEUROLOGY | Facility: CLINIC | Age: 53
End: 2018-09-05

## 2018-09-05 VITALS
SYSTOLIC BLOOD PRESSURE: 110 MMHG | BODY MASS INDEX: 21.8 KG/M2 | DIASTOLIC BLOOD PRESSURE: 77 MMHG | TEMPERATURE: 98.1 F | WEIGHT: 115.4 LBS | HEART RATE: 58 BPM

## 2018-09-05 DIAGNOSIS — G40.209 PARTIAL EPILEPSY WITH IMPAIRMENT OF CONSCIOUSNESS (H): ICD-10-CM

## 2018-09-05 RX ORDER — LEVETIRACETAM 500 MG/1
TABLET ORAL
Qty: 360 TABLET | Refills: 3 | Status: SHIPPED | OUTPATIENT
Start: 2018-09-05 | End: 2018-12-13

## 2018-09-05 ASSESSMENT — PAIN SCALES - GENERAL: PAINLEVEL: NO PAIN (0)

## 2018-09-05 NOTE — LETTER
2018     RE: Kimberly Noe  : 1965   MRN: [id number]      Dear Colleague,    Thank you for referring your patient, Kimberly Noe, to [facility] at [facility]. Please see a copy of my visit note below.    [facility] Progress Note      Patient:  Kimberly Noe  :  1965   Age:  53 year old   Today's Office Visit:  2018    Epilepsy Data:    Seizure Record  Current Visit Date: 18  Previous Visit Date: 17  Months since last visit: 11.47    Seizure Type 1: Partial seizures with secondary generalization  - with simple partial seizures evolving to generalized seizures  Description of Sz Type 1: dreeam like state, visual changes, then convulsion  # of Type 1 Seizure since last visit: 0  Freq. Type 1 / Month: 0    History of Present Illness:     No seizures. No warnings for seizures.     Since last visit we [facility] to increase levetiracetam and initiate reduction of PHT. We are transitioniing from PHT to levetiracetam because of concerns that long term phenytoin treatment will be deleterious to bone health, etc.    Current Outpatient Prescriptions   Medication Sig Dispense Refill     CALCIUM 500 MG OR TABS 1000 mg daily  0     DILANTIN 100 MG CR capsule TAKE 2 CAPSULES BY MOUTH DAILY EMELYN Brand only, do not substitute 60 capsule 11     levETIRAcetam (KEPPRA) 500 MG tablet INCREASE AS INSTRUCTED UNTIL TAKING 2 TABLETS TWICE DAILY 360 tablet 3     phenytoin (DILANTIN) 30 MG CR capsule Take one po QHS Brand name necessary, no not substitute. 30 capsule 11     BOOSTRIX 5-2.5-18.5 injection ADM 0.5ML IM UTD  0     clotrimazole (LOTRIMIN) 1 % cream   0     CRANBERRY daily.        folic acid (FOLVITE) 1 MG tablet TAKE TWO TABLETS BY MOUTH EVERY MORNING (Patient not taking: Reported on 2017) 180 tablet 3     ketoconazole (NIZORAL) 2 % cream JAH EXT AA QD  3      MULTI-VITAMIN OR TABS 1 TABLET DAILY       Omega-3 Fatty Acids (OMEGA 3 PO) 1 daily.        ORDER FOR DME Equipment being ordered: velcro wrist splint (Patient not taking: Reported on 9/21/2017) 1 each 0     VITAMIN D 400 UNIT OR TABS 2000mg po per day 3mo 3        Perceived AED Side Effects:  Yes; some sedation as levetiracetam increased then this stopped. At full alertness at present.    Medication Notes:    Taking levetiracetam [phone]. Also  mg per day.  Two more weeks of dilantin and then she stops.      AED Medication Compliance:  compliant all of the time  Using a pill box:  Yes    Review of Systems:  Denies headaches. Denies loss of vision. Denies double vision. Denies dysphagia. Denies cough, wheezing, hemoptysis. Denies chest pain, leg swelling. Denies significant weight change, abdominal pain, nausea, vomiting, change in bowel habits, blood per rectum. Denies dysuria, hematuria, flank pain, or kidney stones. No fractures.  Have you experienced a traumatic fall since your last visit: NO  Are these falls related to your seizures:  Not Applicable      Other Issues:    No other health troubles.   Primary care notes that WBC a little low; has attributed to PHT.  Results not on care everywhere; she tried to call up results on smart phone but was unable to do so.  Is patient safe to drive:  Yes    Woman Care:   Patient is:  Postmenopausal for two years.    Exam:    /77 (BP Location: Left arm, Patient Position: Chair, Cuff Size: Adult Regular)  Pulse 58  Temp 98.1  F (36.7  C)  Wt 115 lb 6.4 oz (52.3 kg)  BMI 21.8 kg/m2     Wt Readings from Last 5 Encounters:   09/05/18 115 lb 6.4 oz (52.3 kg)   03/18/18 120 lb (54.4 kg)   09/21/17 117 lb 9.6 oz (53.3 kg)   10/18/16 119 lb 3.2 oz (54.1 kg)   07/06/16 121 lb (54.9 kg)     PHYSICAL EXAMINATION:     She is her usual pleasant and cooperative self.  Gait is normal including tandem and Romberg.  One-foot stand is done well.  Visual fields are  full.  Extraocular movements are intact without nystagmus.  Smile is symmetrical.  Tongue is midline and strong.  There is no drift, pronation or tremor.  Reflexes are symmetrical.  Knee jerks and ankle jerks are easily obtained.  Vibratory sensation is 10 seconds at the large toe bilaterally.  Finger-finger-nose is done well.    IMPRESSION:   1.  Partial seizures, not intractable.  Simple to complex partial to secondarily generalized tonic-clonic seizures.  She continues with complete control of seizures.  She has now been seizure free for more than 20 years.  However, her seizures consistently broken through when anticonvulsant levels are low or medications were discontinued in the past.  History does suggest that she needs anticonvulsants indefinitely. She is at therapeutic doses of levetiracetam and is tapering phenytoin slowly but has not yet discontinued this medication.  2.  Previous indications of osteopenia, elevated Alk phosphatase as well as her slight habitus have prompted concerns about future bone health issues and further prompted transition to levetiracetam.      PLAN:   1. Continue with current levetiracetam. Continue with plan to discontinue phenytoin.  2. Call if any seizures.  3. RTC one year. If doing well can probably transition to local care; she lives in [place] and trip to [facility] [facility] can be lengthy tho she has family here.           JUANCHO GOMEZ MD

## 2018-09-05 NOTE — PROGRESS NOTES
[facility]      Patient:  Kimberly Noe  :  1965   Age:  53 year old   Today's Office Visit:  2018    Epilepsy Data:    Seizure Record  Current Visit Date: 18  Previous Visit Date: 17  Months since last visit: 11.47    Seizure Type 1: Partial seizures with secondary generalization  - with simple partial seizures evolving to generalized seizures  Description of Sz Type 1: dreeam like state, visual changes, then convulsion  # of Type 1 Seizure since last visit: 0  Freq. Type 1 / Month: 0    History of Present Illness:     No seizures. No warnings for seizures.     Since last visit we have engaged in [facility] phone calls to increase levetiracetam and initiate reduction of PHT. We are transitioniing from PHT to levetiracetam because of concerns that long term phenytoin treatment will be deleterious to bone health, etc.    Current Outpatient Prescriptions   Medication Sig Dispense Refill     CALCIUM 500 MG OR TABS 1000 mg daily  0     DILANTIN 100 MG CR capsule TAKE 2 CAPSULES BY MOUTH DAILY EMLEYN Brand only, do not substitute 60 capsule 11     levETIRAcetam (KEPPRA) 500 MG tablet INCREASE AS INSTRUCTED UNTIL TAKING 2 TABLETS TWICE DAILY 360 tablet 3     phenytoin (DILANTIN) 30 MG CR capsule Take one po QHS Brand name necessary, no not substitute. 30 capsule 11     BOOSTRIX 5-2.5-18.5 injection ADM 0.5ML IM UTD  0     clotrimazole (LOTRIMIN) 1 % cream   0     CRANBERRY daily.        folic acid (FOLVITE) 1 MG tablet TAKE TWO TABLETS BY MOUTH EVERY MORNING (Patient not taking: Reported on 2017) 180 tablet 3     ketoconazole (NIZORAL) 2 % cream JAH EXT AA QD  3     MULTI-VITAMIN OR TABS 1 TABLET DAILY       Omega-3 Fatty Acids (OMEGA 3 PO) 1 daily.        ORDER FOR DME Equipment being ordered: velcro wrist splint (Patient not taking: Reported on 2017) 1 each 0     VITAMIN D 400 UNIT OR TABS 2000mg po per day 3mo 3        Perceived AED Side Effects:   Yes; some sedation as levetiracetam increased then this stopped. At full alertness at present.    Medication Notes:    Taking levetiracetam [phone]. Also  mg per day.  Two more weeks of dilantin and then she stops.      AED Medication Compliance:  compliant all of the time  Using a pill box:  Yes    Review of Systems:  Denies headaches. Denies loss of vision. Denies double vision. Denies dysphagia. Denies cough, wheezing, hemoptysis. Denies chest pain, leg swelling. Denies significant weight change, abdominal pain, nausea, vomiting, change in bowel habits, blood per rectum. Denies dysuria, hematuria, flank pain, or kidney stones. No fractures.  Have you experienced a traumatic fall since your last visit: NO  Are these falls related to your seizures:  Not Applicable      Other Issues:    No other health troubles.   Primary care notes that WBC a little low; has attributed to PHT.  Results not on care everywhere; she tried to call up results on smart phone but was unable to do so.  Is patient safe to drive:  Yes    Woman Care:   Patient is:  Postmenopausal for two years.    Exam:    /77 (BP Location: Left arm, Patient Position: Chair, Cuff Size: Adult Regular)  Pulse 58  Temp 98.1  F (36.7  C)  Wt 115 lb 6.4 oz (52.3 kg)  BMI 21.8 kg/m2     Wt Readings from Last 5 Encounters:   09/05/18 115 lb 6.4 oz (52.3 kg)   03/18/18 120 lb (54.4 kg)   09/21/17 117 lb 9.6 oz (53.3 kg)   10/18/16 119 lb 3.2 oz (54.1 kg)   07/06/16 121 lb (54.9 kg)     PHYSICAL EXAMINATION:     She is her usual pleasant and cooperative self.  Gait is normal including tandem and Romberg.  One-foot stand is done well.  Visual fields are full.  Extraocular movements are intact without nystagmus.  Smile is symmetrical.  Tongue is midline and strong.  There is no drift, pronation or tremor.  Reflexes are symmetrical.  Knee jerks and ankle jerks are easily obtained.  Vibratory sensation is 10 seconds at the large toe bilaterally.   Finger-finger-nose is done well.    IMPRESSION:   1.  Partial seizures, not intractable.  Simple to complex partial to secondarily generalized tonic-clonic seizures.  She continues with complete control of seizures.  She has now been seizure free for more than 20 years.  However, her seizures consistently broken through when anticonvulsant levels are low or medications were discontinued in the past.  History does suggest that she needs anticonvulsants indefinitely. She is at therapeutic doses of levetiracetam and is tapering phenytoin slowly but has not yet discontinued this medication.  2.  Previous indications of osteopenia, elevated Alk phosphatase as well as her slight habitus have prompted concerns about future bone health issues and further prompted transition to levetiracetam.      PLAN:   1. Continue with current levetiracetam. Continue with plan to discontinue phenytoin.  2. Call if any seizures.  3. RTC one year. If doing well can probably transition to local care; she lives in [place] and trip to [facility] can be lengthy tho she has family here.           JUANCHO GOMEZ MD

## 2018-09-05 NOTE — MR AVS SNAPSHOT
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      Who to contact     Please call your clinic at [phone] to:    Ask questions about your health    Make or cancel appointments    Discuss your medicines    Learn about your test results    Speak to your doctor            Additional Information About Your Visit        [email or web address] Information     [employer] gives you secure access to your electronic health record. If you see a primary care provider, you can also send messages to your care team and make appointments. If you have questions, please call your primary care clinic.  If you do not have a primary care provider, please call [phone] and they will assist you.      [employer] is an electronic gateway that provides easy, online access to your medical records. With [employer], you can request a clinic appointment, read your test results, renew a prescription or communicate with your care team.     To access your existing account, please contact your [facility] or call [phone] for assistance.        Care EveryWhere ID     This is your Care EveryWhere ID. This could be used by other organizations to access your [place] medical records  [id number]        Your Vitals Were     Pulse Temperature BMI (Body Mass Index)             58 98.1  F (36.7  C) 21.8 kg/m2          Blood Pressure from Last 3 Encounters:   09/05/18 110/77   03/18/18 98/64   09/21/17 118/58    Weight from Last 3 Encounters:   09/05/18 115 lb 6.4 oz (52.3 kg)   03/18/18 120 lb  (54.4 kg)   09/21/17 117 lb 9.6 oz (53.3 kg)              Today, you had the following     No orders found for display       Primary Care Provider Office Phone # Fax #    Camron Mcfarland [phone]       [place] [id number] [facility] [id number]        Equal Access to Services     [facility]: Hadii aad ku hadasho Soomaali, waaxda luqadaha, qaybta kaalmada adeegyada, waxay idiin hayaan adeeg tameka labakarin . So [facility] [phone].    ATENCIÓN: Si habla español, tiene a santana disposición servicios gratuitos de asistencia lingüística. Llame al [phone].    We comply with applicable federal civil rights laws and [place] laws. We do not discriminate on the basis of race, color, national origin, age, disability, sex, sexual orientation, or gender identity.            Thank you!     Thank you for choosing [facility]  for your care. Our goal is always to provide you with excellent care. Hearing back from our patients is one way we can continue to improve our services. Please take a few minutes to complete the written survey that you may receive in the mail after your visit with us. Thank you!             Your Updated Medication List - Protect others around you: Learn how to safely use, store and throw away your medicines at www.disposemymeds.org.          This list is accurate as of 9/5/18  3:28 PM.  Always use your most recent med list.                   Brand Name Dispense Instructions for use Diagnosis    BOOSTRIX 5-2.5-18.5 LF-MCG/0.5 injection   Generic drug:  Tdap (tetanus-diptheria-acell pertussis)      ADM 0.5ML IM UTD        calcium carbonate 500 mg {elemental} 500 MG tablet    OS-MARK     1000 mg daily        clotrimazole 1 % cream    LOTRIMIN          CRANBERRY      daily.        * DILANTIN 100 MG CR capsule   Generic drug:  phenytoin     60 capsule    TAKE 2 CAPSULES BY MOUTH DAILY EMELYN Brand only, do not substitute    Partial epilepsy with impairment of  consciousness (H)       * phenytoin 30 MG CR capsule    DILANTIN    30 capsule    Take one po QHS Brand name necessary, no not substitute.    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        order for DME     1 each    Equipment being ordered: velcro wrist splint    Left wrist pain       vitamin D 400 units tablet     3mo    2000mg po per day    Routine general medical examination at a health care facility       * Notice:  This list has 2 medication(s) that are the same as other medications prescribed for you. Read the directions carefully, and ask your doctor or other care provider to review them with you.

## 2018-12-13 DIAGNOSIS — G40.209 PARTIAL EPILEPSY WITH IMPAIRMENT OF CONSCIOUSNESS (H): Primary | ICD-10-CM

## 2018-12-13 RX ORDER — LEVETIRACETAM 1000 MG/1
TABLET ORAL
Qty: 182 TABLET | Refills: 3 | Status: SHIPPED | OUTPATIENT
Start: 2018-12-13 | End: 2019-11-27

## 2019-03-14 ENCOUNTER — TELEPHONE (OUTPATIENT)
Dept: NEUROLOGY | Facility: CLINIC | Age: 54
End: 2019-03-14

## 2019-03-14 NOTE — TELEPHONE ENCOUNTER
Nurse received In-Basket message as follows:    Hawk Frye MA sent to [facility]n Pool             Caller: Cynthia     Relationship to Patient: [facility] pharmacy     Call Back Number: [phone]     Reason for Call: Want to change  Keppra from 1000mg to 250mg.      Nurse returned call to pharmacy and spoke with pharmacist who states that they were able to order 1000 mg tabs last night and if they do not come today they will call back.  No further questions.

## 2019-09-29 ENCOUNTER — HEALTH MAINTENANCE LETTER (OUTPATIENT)
Age: 54
End: 2019-09-29

## 2019-10-28 ENCOUNTER — HEALTH MAINTENANCE LETTER (OUTPATIENT)
Age: 54
End: 2019-10-28

## 2019-11-26 DIAGNOSIS — G40.209 PARTIAL EPILEPSY WITH IMPAIRMENT OF CONSCIOUSNESS (H): Primary | ICD-10-CM

## 2019-11-27 RX ORDER — LEVETIRACETAM 1000 MG/1
TABLET ORAL
Qty: 182 TABLET | Refills: 3 | Status: SHIPPED | OUTPATIENT
Start: 2019-11-27 | End: 2019-12-19

## 2019-12-19 DIAGNOSIS — G40.209 PARTIAL EPILEPSY WITH IMPAIRMENT OF CONSCIOUSNESS (H): Primary | ICD-10-CM

## 2019-12-19 RX ORDER — LEVETIRACETAM 1000 MG/1
TABLET ORAL
Qty: 182 TABLET | Refills: 3 | Status: SHIPPED | OUTPATIENT
Start: 2019-12-19 | End: 2020-11-15

## 2020-11-11 DIAGNOSIS — G40.209 PARTIAL EPILEPSY WITH IMPAIRMENT OF CONSCIOUSNESS (H): ICD-10-CM

## 2020-11-15 RX ORDER — LEVETIRACETAM 1000 MG/1
TABLET ORAL
Qty: 182 TABLET | Refills: 0 | Status: SHIPPED | OUTPATIENT
Start: 2020-11-15 | End: 2020-12-10

## 2020-11-15 NOTE — TELEPHONE ENCOUNTER
levETIRAcetam (KEPPRA) 1000 MG tablet   Last Written Prescription Date:  12/19/19  Last Fill Quantity: 182,   # refills: 3  Last Office Visit : 9/5/18  Future Office visit: 12/10/20

## 2020-12-10 ENCOUNTER — VIRTUAL VISIT (OUTPATIENT)
Dept: NEUROLOGY | Facility: CLINIC | Age: 55
End: 2020-12-10
Payer: COMMERCIAL

## 2020-12-10 DIAGNOSIS — G40.209 PARTIAL EPILEPSY WITH IMPAIRMENT OF CONSCIOUSNESS (H): ICD-10-CM

## 2020-12-10 RX ORDER — LEVETIRACETAM 1000 MG/1
TABLET ORAL
Qty: 182 TABLET | Refills: 3 | Status: SHIPPED | OUTPATIENT
Start: 2020-12-10 | End: 2022-01-28

## 2020-12-10 NOTE — PROGRESS NOTES
[facility]      Patient:  Kimberly Noe  :  1965   Age:  55 year old   Today's Office Visit:  12/10/2020    Epilepsy Data:               Seizure Record  Current Visit Date: 12/10/20  Previous Visit Date: 18  Months since last visit: 27.17  Seizure Type 1: Tonic-clonic seizures  Description of Sz Type 1: convulsion  # of Type 1 Seizure since last visit: 0  Freq. Type 1 / Month: 0    Background History:  Infrequent CPS; seven during her life, typically in setting of significant sleep deprivation, low PHT level or signficant stress. Two attempts at AED discontinuaition (one after five years of seizure freedom) resulted in seizure breakthrough. There have been other seiuzre breakthroughs thru low PHT levels so we have concluded she needs AEDs indefinitely. Hypovitaminosis D, treated. DEXA 2012 with LS spine BMD 1.210 (Z score + 0.7), Right hip BMD 0.892 (Z score -0.2), Left hip BMD 0.883 (Z score -0.3).    History of Present Illness:   Last visit more than two years ago. During that visit we arranged for last phase of transition from phenytoin to levetiracetam with discontinuation of phenytoin.    She denies any seizures. No convulsions. Last more than 20 years ago. No myoclonus. No absence.    She is off the phenytoin for more than one year.    Current Outpatient Medications   Medication Sig Dispense Refill     CALCIUM 500 MG OR TABS 1000 mg daily  0     CRANBERRY daily.        levETIRAcetam (KEPPRA) 1000 MG tablet TAKE 1 TABLET BY MOUTH TWICE DAILY. Please keep appt 12/10/20. 182 tablet 0     Omega-3 Fatty Acids (OMEGA 3 PO) 1 daily.        VITAMIN D 400 UNIT OR TABS 2000mg po per day 3mo 3     BOOSTRIX 5-2.5-18.5 injection ADM 0.5ML IM UTD  0     clotrimazole (LOTRIMIN) 1 % cream   0     DILANTIN 100 MG CR capsule TAKE 2 CAPSULES BY MOUTH DAILY EMELYN Brand only, do not substitute (Patient not taking: Reported on 12/10/2020) 60 capsule 11     folic acid  (FOLVITE) 1 MG tablet TAKE TWO TABLETS BY MOUTH EVERY MORNING (Patient not taking: Reported on 9/21/2017) 180 tablet 3     ketoconazole (NIZORAL) 2 % cream JAH EXT AA QD  3     MULTI-VITAMIN OR TABS 1 TABLET DAILY       ORDER FOR DME Equipment being ordered: velcro wrist splint (Patient not taking: Reported on 9/21/2017) 1 each 0     phenytoin (DILANTIN) 30 MG CR capsule Take one po QHS Brand name necessary, no not substitute. (Patient not taking: Reported on 12/10/2020) 30 capsule 11        Perceived AED Side Effects:  No    Medication Notes:    No side effects. She is no longer taking phenytoin or folic acid.    AED Medication Compliance:  compliant all of the time  Using a pill box:  Yes    Review of Systems:  Denies headaches. Denies loss of vision. Denies double vision. Denies dysphagia. Denies cough, wheezing, hemoptysis. Denies chest pain, leg swelling. Denies significant weight change, abdominal pain, nausea, vomiting, change in bowel habits, blood per rectum. Denies dysuria, hematuria, flank pain, or kidney stones.  Have you experienced a traumatic fall since your last visit: NO  Are these falls related to your seizures:  Not Applicable      Other Issues:    She stays at home. Not working.  No other health troubles. No trouble with mood. NO anxiety or panic attacks.  No fractures or treatment for osteoprosis.  Is patient safe to drive:  Yes    Woman Care:   Patient is:  Post menopausal.    Exam:    There were no vitals taken for this visit.     Wt Readings from Last 5 Encounters:   09/05/18 115 lb 6.4 oz (52.3 kg)   03/18/18 120 lb (54.4 kg)   09/21/17 117 lb 9.6 oz (53.3 kg)   10/18/16 119 lb 3.2 oz (54.1 kg)   07/06/16 121 lb (54.9 kg)     Self reported weight 118 pounds. Neurological examination was performed over video link. Alert and fully oriented. Language was fluent with normal content. Mood was normal with no indication of depression or anxiety. Normal straightaway gait. Extraocular movements  "intact without nystagmus. Smile symmetrial. Tongue midline. There is no drift, pronation, or tremor. Finger finger nose was done well. Rapid fine movements were done well and symmetrically.    IMPRESSION:   1.  Focal epilepsy, not intractable.  Focal aware to bilateral tonic-clonic seizures.  She continues with complete control of seizures.  She has now been seizure free for more than 22 years.  However, her seizures consistently broken through when anticonvulsant levels are low or medications were discontinued in the past.  History does suggest that she needs anticonvulsants indefinitely. She remains seizure free on levetiracetam monotherapy, off phenytoin for more than one year.  2.  Previous indications of osteopenia, elevated Alk phosphatase as well as her slight habitus have prompted concerns about future bone health issues and further prompted transition to levetiracetam. No fractures since last visit, continues with calcium vitamin D.    DISCUSSION  Discussed need for ongoing care, potential side effects of levetiracetam and avoidance of risky activities.    PLAN:   1. Continue with current levetiracetam. Refilled for one year.  2. Call if any seizures.  3. RTC one year.       VIDEO VISIT PARAMETERS  Video visit application used: chico  Patient or representative agreed to visit? Yes  Patient initiated visit: Yes  Patient location during visit: home  Physician location during visit: [facility]  Time of visit start: 105 PM  Time of visit finish: 126 PM  Total time of visit: 21 minutes; more than half counselling and coordinating cares.     Hieu Yoo MD                Kimberly Noe is a 55 year old female who is being evaluated via a billable video visit.      The patient has been notified of following:     \"This video visit will be conducted via a call between you and your physician/provider. We have found that certain health care needs can be provided without the need for an " "in-person physical exam.  This service lets us provide the care you need with a video conversation.  If a prescription is necessary we can send it directly to your pharmacy.  If lab work is needed we can place an order for that and you can then stop by our lab to have the test done at a later time.    Video visits are billed at different rates depending on your insurance coverage.  Please reach out to your insurance provider with any questions.    If during the course of the call the physician/provider feels a video visit is not appropriate, you will not be charged for this service.\"    Patient has given verbal consent for Video visit? Yes  How would you like to obtain your AVS? MyChart  If you are dropped from the video visit, the video invite should be resent to: Text to cell phone: [phone]  Will anyone else be joining your video visit? No        Video-Visit Details    Type of service:  Video Visit    Please see physician note for televisit parameters.    Hieu Yoo MD    "

## 2020-12-10 NOTE — LETTER
12/10/2020       RE: Kimberly Noe  : 1965   MRN: [id number]      Dear Colleague,    Thank you for referring your patient, Kimberly Noe, to the [facility] EPILEPSY CARE at [facility]. Please see a copy of my visit note below.    [facility] Epilepsy Care Progress Note      Patient:  Kimberly Noe  :  1965   Age:  55 year old   Today's Office Visit:  12/10/2020    Epilepsy Data:               Seizure Record  Current Visit Date: 12/10/20  Previous Visit Date: 18  Months since last visit: 27.17  Seizure Type 1: Tonic-clonic seizures  Description of Sz Type 1: convulsion  # of Type 1 Seizure since last visit: 0  Freq. Type 1 / Month: 0    Background History:  Infrequent CPS; seven during her life, typically in setting of significant sleep deprivation, low PHT level or signficant stress. Two attempts at AED discontinuaition (one after five years of seizure freedom) resulted in seizure breakthrough. There have been other seiuzre breakthroughs thru low PHT levels so we have concluded she needs AEDs indefinitely. Hypovitaminosis D, treated. DEXA 2012 with LS spine BMD 1.210 (Z score + 0.7), Right hip BMD 0.892 (Z score -0.2), Left hip BMD 0.883 (Z score -0.3).    History of Present Illness:   Last visit more than two years ago. During that visit we arranged for last phase of transition from phenytoin to levetiracetam with discontinuation of phenytoin.    She denies any seizures. No convulsions. Last more than 20 years ago. No myoclonus. No absence.    She is off the phenytoin for more than one year.    Current Outpatient Medications   Medication Sig Dispense Refill     CALCIUM 500 MG OR TABS 1000 mg daily  0     CRANBERRY daily.        levETIRAcetam (KEPPRA) 1000 MG tablet TAKE 1 TABLET BY MOUTH TWICE DAILY. Please keep appt 12/10/20. 182 tablet 0     Omega-3 Fatty Acids (OMEGA 3 PO) 1 daily.        VITAMIN D 400 UNIT OR TABS  2000mg po per day 3mo 3     BOOSTRIX 5-2.5-18.5 injection ADM 0.5ML IM UTD  0     clotrimazole (LOTRIMIN) 1 % cream   0     DILANTIN 100 MG CR capsule TAKE 2 CAPSULES BY MOUTH DAILY EMELYN Brand only, do not substitute (Patient not taking: Reported on 12/10/2020) 60 capsule 11     folic acid (FOLVITE) 1 MG tablet TAKE TWO TABLETS BY MOUTH EVERY MORNING (Patient not taking: Reported on 9/21/2017) 180 tablet 3     ketoconazole (NIZORAL) 2 % cream JAH EXT AA QD  3     MULTI-VITAMIN OR TABS 1 TABLET DAILY       ORDER FOR DME Equipment being ordered: velcro wrist splint (Patient not taking: Reported on 9/21/2017) 1 each 0     phenytoin (DILANTIN) 30 MG CR capsule Take one po QHS Brand name necessary, no not substitute. (Patient not taking: Reported on 12/10/2020) 30 capsule 11        Perceived AED Side Effects:  No    Medication Notes:    No side effects. She is no longer taking phenytoin or folic acid.    AED Medication Compliance:  compliant all of the time  Using a pill box:  Yes    Review of Systems:  Denies headaches. Denies loss of vision. Denies double vision. Denies dysphagia. Denies cough, wheezing, hemoptysis. Denies chest pain, leg swelling. Denies significant weight change, abdominal pain, nausea, vomiting, change in bowel habits, blood per rectum. Denies dysuria, hematuria, flank pain, or kidney stones.  Have you experienced a traumatic fall since your last visit: NO  Are these falls related to your seizures:  Not Applicable      Other Issues:    She stays at home. Not working.  No other health troubles. No trouble with mood. NO anxiety or panic attacks.  No fractures or treatment for osteoprosis.  Is patient safe to drive:  Yes    Woman Care:   Patient is:  Post menopausal.    Exam:    There were no vitals taken for this visit.     Wt Readings from Last 5 Encounters:   09/05/18 115 lb 6.4 oz (52.3 kg)   03/18/18 120 lb (54.4 kg)   09/21/17 117 lb 9.6 oz (53.3 kg)   10/18/16 119 lb 3.2 oz (54.1 kg)   07/06/16  121 lb (54.9 kg)     Self reported weight 118 pounds. Neurological examination was performed over video link. Alert and fully oriented. Language was fluent with normal content. Mood was normal with no indication of depression or anxiety. Normal straightaway gait. Extraocular movements intact without nystagmus. Smile symmetrial. Tongue midline. There is no drift, pronation, or tremor. Finger finger nose was done well. Rapid fine movements were done well and symmetrically.    IMPRESSION:   1.  Focal epilepsy, not intractable.  Focal aware to bilateral tonic-clonic seizures.  She continues with complete control of seizures.  She has now been seizure free for more than 22 years.  However, her seizures consistently broken through when anticonvulsant levels are low or medications were discontinued in the past.  History does suggest that she needs anticonvulsants indefinitely. She remains seizure free on levetiracetam monotherapy, off phenytoin for more than one year.  2.  Previous indications of osteopenia, elevated Alk phosphatase as well as her slight habitus have prompted concerns about future bone health issues and further prompted transition to levetiracetam. No fractures since last visit, continues with calcium vitamin D.    DISCUSSION  Discussed need for ongoing care, potential side effects of levetiracetam and avoidance of risky activities.    PLAN:   1. Continue with current levetiracetam. Refilled for one year.  2. Call if any seizures.  3. RTC one year.       VIDEO VISIT PARAMETERS  Video visit application used: chico  Patient or representative agreed to visit? Yes  Patient initiated visit: Yes  Patient location during visit: home  Physician location during visit: [facility]  Time of visit start: 105 PM  Time of visit finish: 126 PM  Total time of visit: 21 minutes; more than half counselling and coordinating cares.     MD Kimberly Alicea KIRBY Roberto Kusum is a 55 year old  "female who is being evaluated via a billable video visit.      The patient has been notified of following:     \"This video visit will be conducted via a call between you and your physician/provider. We have found that certain health care needs can be provided without the need for an in-person physical exam.  This service lets us provide the care you need with a video conversation.  If a prescription is necessary we can send it directly to your pharmacy.  If lab work is needed we can place an order for that and you can then stop by our lab to have the test done at a later time.    Video visits are billed at different rates depending on your insurance coverage.  Please reach out to your insurance provider with any questions.    If during the course of the call the physician/provider feels a video visit is not appropriate, you will not be charged for this service.\"    Patient has given verbal consent for Video visit? Yes  How would you like to obtain your AVS? MyChart  If you are dropped from the video visit, the video invite should be resent to: Text to cell phone: [phone]  Will anyone else be joining your video visit? No        Video-Visit Details    Type of service:  Video Visit    Please see physician note for televisit parameters.    Hieu Yoo MD      "

## 2021-01-14 ENCOUNTER — HEALTH MAINTENANCE LETTER (OUTPATIENT)
Age: 56
End: 2021-01-14

## 2021-07-06 NOTE — PROGRESS NOTES
Entered in error   [facility] attempted to contact pt to notify her of ED f/u appt. Spoke with family member, asked if pt could return my call.

## 2021-10-24 ENCOUNTER — HEALTH MAINTENANCE LETTER (OUTPATIENT)
Age: 56
End: 2021-10-24

## 2022-01-24 DIAGNOSIS — G40.209 PARTIAL EPILEPSY WITH IMPAIRMENT OF CONSCIOUSNESS (H): ICD-10-CM

## 2022-01-28 RX ORDER — LEVETIRACETAM 1000 MG/1
1000 TABLET ORAL 2 TIMES DAILY
Qty: 60 TABLET | Refills: 0 | Status: SHIPPED | OUTPATIENT
Start: 2022-01-28

## 2022-01-28 NOTE — TELEPHONE ENCOUNTER
levETIRAcetam (KEPPRA) 1000 MG tablet   TAKE 1 TABLET BY MOUTH TWICE DAILY     Last Written Prescription Date:  12/10/20  Last Fill Quantity: 182,   # refills: 3  Last Office Visit : 12/10/20  -Continue with current levetiracetam  -RTC one year.   Future Office visit: none    Routing because:  Overdue visit. 30 day RF.     Scheduling has been notified to contact the pt for appointment.

## 2022-02-13 ENCOUNTER — HEALTH MAINTENANCE LETTER (OUTPATIENT)
Age: 57
End: 2022-02-13

## 2022-03-07 NOTE — TELEPHONE ENCOUNTER
Note received from the  that this patient is now seeing a provider in [place]. No further refills will need to be provided by this practice.

## 2022-10-15 ENCOUNTER — HEALTH MAINTENANCE LETTER (OUTPATIENT)
Age: 57
End: 2022-10-15

## 2023-03-26 ENCOUNTER — HEALTH MAINTENANCE LETTER (OUTPATIENT)
Age: 58
End: 2023-03-26

## 2023-10-29 ENCOUNTER — HEALTH MAINTENANCE LETTER (OUTPATIENT)
Age: 58
End: 2023-10-29

## 2024-05-26 ENCOUNTER — HEALTH MAINTENANCE LETTER (OUTPATIENT)
Age: 59
End: 2024-05-26